# Patient Record
Sex: FEMALE | URBAN - METROPOLITAN AREA
[De-identification: names, ages, dates, MRNs, and addresses within clinical notes are randomized per-mention and may not be internally consistent; named-entity substitution may affect disease eponyms.]

---
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
## 2022-02-03 ENCOUNTER — APPOINTMENT (RX ONLY)
Dept: URBAN - METROPOLITAN AREA CLINIC 176 | Facility: CLINIC | Age: 22
Setting detail: DERMATOLOGY
End: 2022-02-03

## 2022-02-03 VITALS — WEIGHT: 112 LBS | HEIGHT: 66 IN

## 2022-02-03 DIAGNOSIS — Z72.0 TOBACCO USE: ICD-10-CM

## 2022-02-03 DIAGNOSIS — Z79.899 OTHER LONG TERM (CURRENT) DRUG THERAPY: ICD-10-CM

## 2022-02-03 DIAGNOSIS — L70.0 ACNE VULGARIS: ICD-10-CM

## 2022-02-03 PROCEDURE — 99214 OFFICE O/P EST MOD 30 MIN: CPT

## 2022-02-03 PROCEDURE — 81025 URINE PREGNANCY TEST: CPT

## 2022-02-03 PROCEDURE — ? ADDITIONAL NOTES

## 2022-02-03 PROCEDURE — ? SMOKING CESSATION COUNSELING

## 2022-02-03 PROCEDURE — ? COUNSELING

## 2022-02-03 PROCEDURE — ? PRESCRIPTION

## 2022-02-03 PROCEDURE — ? URINE PREGNANCY TEST

## 2022-02-03 PROCEDURE — ? ORDER TESTS

## 2022-02-03 PROCEDURE — ? ISOTRETINOIN MONITORING

## 2022-02-03 RX ORDER — ISOTRETINOIN 30 MG/1
CAPSULE ORAL
Qty: 30 | Refills: 0 | Status: ERX

## 2022-02-03 ASSESSMENT — LOCATION ZONE DERM
LOCATION ZONE: FACE
LOCATION ZONE: TRUNK

## 2022-02-03 ASSESSMENT — LOCATION SIMPLE DESCRIPTION DERM
LOCATION SIMPLE: LEFT FOREHEAD
LOCATION SIMPLE: RIGHT CHEEK
LOCATION SIMPLE: CHEST
LOCATION SIMPLE: LEFT CHEEK

## 2022-02-03 NOTE — PROCEDURE: ADDITIONAL NOTES
Additional Notes: Patient states rhofade made redness worse so stopped, I think redness may just be from dryness from accutane. She looks great we are going to taper her to one pill by mouth once daily. I still clear next month can probably D/C. \\n\\n\\nLN:\\nPt states had redness in cheeks we will try some rhofade topical once daily and see if that helps with redness. She needs labs! She states she will go she states she lost the slip, she is feeling well and no SE so I do not feel anything should be abnormal I would just like one on the higher dose to be sure internally we are all good. A deeper breakout on the L cheek today. \\n\\nLN:\\nStill some breakouts this month, she asked about scarring, recommend MN/Peels post accutane, focus to get breakouts under control, did not get labs so urine preg done in office, will go ahead and confirm so she is not out of med, but need labs to make sure the elevated dose did not affect internally. If increase SE then decrease dose. \\n\\nLN: \\nDoing well. Labs look good we will ct with this dose. She did have a flare at the beginning of f the month doing better now. \\n\\nLN: \\n\\nDid well this first month, some dryness noted, ct gentle skin care and chapstick. Decided to increase dose, if increased side effects then decrease back to one pill by mouth daily. \\n\\nLN: \\nPt denies history of depression not taking any medications for it. Discussed fasting before next labs and also to take pill with something a little fatty like avocado or peanut butter to increase absorption.
Render Risk Assessment In Note?: no
Detail Level: Simple

## 2022-02-03 NOTE — HPI: MEDICATION (ISOTRETINOIN)
How Severe Is It?: moderate
Is This A New Presentation, Or A Follow-Up?: Follow Up Isotretinoin
Display Cumulative Dose In The Note?: Yes
Patient Reported Weight In Pounds (Required For Calculation): 110
When Was Isotretinoin Started?: 9/21

## 2022-02-03 NOTE — PROCEDURE: ORDER TESTS
Billing Type: Third-Party Bill
Expected Date Of Service: 08/10/2021
Bill For Surgical Tray: no
Performing Laboratory: [id number]
Lab Facility: [id number]

## 2022-02-03 NOTE — PROCEDURE: ISOTRETINOIN MONITORING
Any Hypertriglyceridemia?: No
Show How Many Months Of Anticipated Therapy Are Left: Yes
Dosing Month 4 (Required For Cumulative Dosing): 30mg BID
Is Xerosis Present?: Yes - Normal Treatment
Hypertriglyceridemia Monitoring: I explained this is common when taking isotretinoin. We will monitor closely.
Nosebleeds Normal Treatment: I explained this is common when taking isotretinoin. I recommended saline mist in each nostril multiple times a day. If this worsens they will contact us.
Use Therapeutic Ranged Or Therapeutic Target: Range
Xerosis Normal Treatment: I recommended application of Cetaphil or CeraVe numerous times a day and before going to bed to all dry areas.
Female Pregnancy Counseling Text: Female patients should also be on two forms of birth control while taking this medication and for one month after their last dose.
Counseling Text: I reviewed the side effect in detail. Patient should get monthly blood tests, not donate blood, not drive at night if vision affected, and not share medication.
Myalgia Monitoring: I explained this is common when taking isotretinoin. If this worsens they will contact us.
Xerosis Aggressive Treatment: I recommended application of Cetaphil or CeraVe numerous times a day and before going to bed to all dry areas. I also prescribed a topical steroid for twice daily use.
Cheilitis Aggressive Treatment: I recommended application of Vaseline or Aquaphor numerous times a day (as often as every hour) and before going to bed. I also prescribed a topical steroid for twice daily use.
Myalgia Treatment: I explained this is common when taking isotretinoin. If this worsens they will contact us. They may try OTC ibuprofen.
Cheilitis Normal Treatment: I recommended application of Vaseline or Aquaphor numerous times a day (as often as every hour) and before going to bed.
Most Recent Beta Hcg (Optional): Negative
Lower Range (In Mg/Kg): 150
Ipledge Number (Optional): [id number]
Pounds Preamble Statement (Weight Entered In Details Tab): Reported Weight in pounds:
Kilograms Preamble Statement (Weight Entered In Details Tab): Reported Weight in kilograms:
Weight Units: pounds
Upper Range (In Mg/Kg): 250
Female Completion Statement: After discussing her treatment course we decided to discontinue isotretinoin therapy at this time. I explained that she would need to continue her birth control methods for at least one month after the last dosage. She should also get a pregnancy test one month after the last dose. She shouldn't donate blood for one month after the last dose. She should call with any new symptoms of depression.
Target Cumulative Dosage (In Mg/Kg): 135
Months Of Therapy Completed: 1
Patient Weight (Optional But Required For Cumulative Dose-Numbers And Decimals Only): 113
Male Completion Statement: After discussing his treatment course we decided to discontinue isotretinoin therapy at this time. He shouldn't donate blood for one month after the last dose. He should call with any new symptoms of depression.
Retinoid Dermatitis Normal Treatment: I recommended more frequent application of Cetaphil or CeraVe to the areas of dermatitis.
Headache Monitoring: I recommended monitoring the headaches for now. There is no evidence of increased intracranial pressure. They were instructed to call if the headaches are worsening.
Retinoid Dermatitis Aggressive Treatment: I recommended more frequent application of Cetaphil or CeraVe to the areas of dermatitis. I also prescribed a topical steroid for twice daily use until the dermatitis resolves.
Dosing Month 1 (Required For Cumulative Dosing): 30mg Daily
Detail Level: Zone
Xerosis Normal Treatment: I recommended application of Cetaphil or CeraVe numerous times a day going to bed to all dry areas.
Xerosis Aggressive Treatment: I recommended application of Cetaphil or CeraVe numerous times a day going to bed to all dry areas. I also prescribed a topical steroid for twice daily use.
What Is The Patient's Gender: Female

## 2022-02-03 NOTE — PROCEDURE: URINE PREGNANCY TEST
Lot # (Optional): [id number]
Performed By: Sara
Detail Level: None
Expiration Date (Optional): 6/30/23
Urine Pregnancy Test Result: negative

## 2022-03-10 ENCOUNTER — APPOINTMENT (RX ONLY)
Dept: URBAN - METROPOLITAN AREA CLINIC 176 | Facility: CLINIC | Age: 22
Setting detail: DERMATOLOGY
End: 2022-03-10

## 2022-03-10 VITALS — HEIGHT: 66 IN | WEIGHT: 112 LBS

## 2022-03-10 DIAGNOSIS — Z72.0 TOBACCO USE: ICD-10-CM

## 2022-03-10 DIAGNOSIS — L70.0 ACNE VULGARIS: ICD-10-CM

## 2022-03-10 PROCEDURE — ? COUNSELING

## 2022-03-10 PROCEDURE — ? ADDITIONAL NOTES

## 2022-03-10 PROCEDURE — ? ISOTRETINOIN MONITORING

## 2022-03-10 PROCEDURE — ? URINE PREGNANCY TEST

## 2022-03-10 PROCEDURE — ? SMOKING CESSATION COUNSELING

## 2022-03-10 PROCEDURE — 81025 URINE PREGNANCY TEST: CPT

## 2022-03-10 PROCEDURE — 99214 OFFICE O/P EST MOD 30 MIN: CPT

## 2022-03-10 ASSESSMENT — LOCATION ZONE DERM
LOCATION ZONE: FACE
LOCATION ZONE: TRUNK

## 2022-03-10 ASSESSMENT — LOCATION DETAILED DESCRIPTION DERM
LOCATION DETAILED: LEFT INFERIOR CENTRAL MALAR CHEEK
LOCATION DETAILED: UPPER STERNUM
LOCATION DETAILED: LEFT INFERIOR MEDIAL FOREHEAD

## 2022-03-10 ASSESSMENT — LOCATION SIMPLE DESCRIPTION DERM
LOCATION SIMPLE: CHEST
LOCATION SIMPLE: LEFT FOREHEAD
LOCATION SIMPLE: LEFT CHEEK

## 2022-03-10 NOTE — HPI: MEDICATION (ISOTRETINOIN)
How Severe Is It?: moderate
Is This A New Presentation, Or A Follow-Up?: Follow Up Isotretinoin
Patient Reported Weight In Pounds (Required For Calculation): 112
When Was Isotretinoin Started?: 8/21

## 2022-03-10 NOTE — PROCEDURE: ISOTRETINOIN MONITORING
Any Hypertriglyceridemia?: No
Completed Therapy?: Yes
Dosing Month 4 (Required For Cumulative Dosing): 30mg BID
Is Xerosis Present?: Yes - Normal Treatment
Hypertriglyceridemia Monitoring: I explained this is common when taking isotretinoin. We will monitor closely.
Are Labs Available For Review?: No- Not Drawn Yet
Nosebleeds Normal Treatment: I explained this is common when taking isotretinoin. I recommended saline mist in each nostril multiple times a day. If this worsens they will contact us.
Use Therapeutic Ranged Or Therapeutic Target: Range
Xerosis Normal Treatment: I recommended application of Cetaphil or CeraVe numerous times a day and before going to bed to all dry areas.
Female Pregnancy Counseling Text: Female patients should also be on two forms of birth control while taking this medication and for one month after their last dose.
Counseling Text: I reviewed the side effect in detail. Patient should get monthly blood tests, not donate blood, not drive at night if vision affected, and not share medication.
Myalgia Monitoring: I explained this is common when taking isotretinoin. If this worsens they will contact us.
Xerosis Aggressive Treatment: I recommended application of Cetaphil or CeraVe numerous times a day and before going to bed to all dry areas. I also prescribed a topical steroid for twice daily use.
Dosing Month 6 (Required For Cumulative Dosing): 30mg Daily
Cheilitis Aggressive Treatment: I recommended application of Vaseline or Aquaphor numerous times a day (as often as every hour) and before going to bed. I also prescribed a topical steroid for twice daily use.
Myalgia Treatment: I explained this is common when taking isotretinoin. If this worsens they will contact us. They may try OTC ibuprofen.
Cheilitis Normal Treatment: I recommended application of Vaseline or Aquaphor numerous times a day (as often as every hour) and before going to bed.
Most Recent Beta Hcg (Optional): Negative
Lower Range (In Mg/Kg): 150
Ipledge Number (Optional): [id number]
Pounds Preamble Statement (Weight Entered In Details Tab): Reported Weight in pounds:
Kilograms Preamble Statement (Weight Entered In Details Tab): Reported Weight in kilograms:
Weight Units: pounds
Upper Range (In Mg/Kg): 250
Female Completion Statement: After discussing her treatment course we decided to discontinue isotretinoin therapy at this time. I explained that she would need to continue her birth control methods for at least one month after the last dosage. She should also get a pregnancy test one month after the last dose. She shouldn't donate blood for one month after the last dose. She should call with any new symptoms of depression.
Target Cumulative Dosage (In Mg/Kg): 135
Months Of Therapy Completed: 1
Patient Weight (Optional But Required For Cumulative Dose-Numbers And Decimals Only): 113
Male Completion Statement: After discussing his treatment course we decided to discontinue isotretinoin therapy at this time. He shouldn't donate blood for one month after the last dose. He should call with any new symptoms of depression.
Retinoid Dermatitis Normal Treatment: I recommended more frequent application of Cetaphil or CeraVe to the areas of dermatitis.
Headache Monitoring: I recommended monitoring the headaches for now. There is no evidence of increased intracranial pressure. They were instructed to call if the headaches are worsening.
Next Month's Dosage: Continue Current Dosage
Retinoid Dermatitis Aggressive Treatment: I recommended more frequent application of Cetaphil or CeraVe to the areas of dermatitis. I also prescribed a topical steroid for twice daily use until the dermatitis resolves.
Detail Level: Zone
Xerosis Normal Treatment: I recommended application of Cetaphil or CeraVe numerous times a day going to bed to all dry areas.
Xerosis Aggressive Treatment: I recommended application of Cetaphil or CeraVe numerous times a day going to bed to all dry areas. I also prescribed a topical steroid for twice daily use.
What Is The Patient's Gender: Female

## 2022-03-10 NOTE — PROCEDURE: ADDITIONAL NOTES
Additional Notes: Pt fully clear! Ok to D/C. \\n\\nLN:\\n\\nPatient states rhofade made redness worse so stopped, I think redness may just be from dryness from accutane. She looks great we are going to taper her to one pill by mouth once daily. I still clear next month can probably D/C. \\n\\n\\nLN:\\nPt states had redness in cheeks we will try some rhofade topical once daily and see if that helps with redness. She needs labs! She states she will go she states she lost the slip, she is feeling well and no SE so I do not feel anything should be abnormal I would just like one on the higher dose to be sure internally we are all good. A deeper breakout on the L cheek today. \\n\\nLN:\\nStill some breakouts this month, she asked about scarring, recommend MN/Peels post accutane, focus to get breakouts under control, did not get labs so urine preg done in office, will go ahead and confirm so she is not out of med, but need labs to make sure the elevated dose did not affect internally. If increase SE then decrease dose. \\n\\nLN: \\nDoing well. Labs look good we will ct with this dose. She did have a flare at the beginning of f the month doing better now. \\n\\nLN: \\n\\nDid well this first month, some dryness noted, ct gentle skin care and chapstick. Decided to increase dose, if increased side effects then decrease back to one pill by mouth daily. \\n\\nLN: \\nPt denies history of depression not taking any medications for it. Discussed fasting before next labs and also to take pill with something a little fatty like avocado or peanut butter to increase absorption.
Render Risk Assessment In Note?: no
Detail Level: Simple

## 2022-03-10 NOTE — PROCEDURE: URINE PREGNANCY TEST
Lot # (Optional): [id number]
Performed By: Sara
Detail Level: None
Expiration Date (Optional): 8/31/23
Urine Pregnancy Test Result: negative

## 2022-04-14 ENCOUNTER — APPOINTMENT (RX ONLY)
Dept: URBAN - METROPOLITAN AREA CLINIC 176 | Facility: CLINIC | Age: 22
Setting detail: DERMATOLOGY
End: 2022-04-14

## 2022-04-14 VITALS — WEIGHT: 113 LBS | HEIGHT: 66 IN

## 2022-04-14 DIAGNOSIS — L81.0 POSTINFLAMMATORY HYPERPIGMENTATION: ICD-10-CM

## 2022-04-14 DIAGNOSIS — L70.0 ACNE VULGARIS: ICD-10-CM | Status: WELL CONTROLLED

## 2022-04-14 DIAGNOSIS — Z72.0 TOBACCO USE: ICD-10-CM

## 2022-04-14 DIAGNOSIS — L30.0 NUMMULAR DERMATITIS: ICD-10-CM | Status: INADEQUATELY CONTROLLED

## 2022-04-14 PROCEDURE — ? PRESCRIPTION SAMPLES PROVIDED

## 2022-04-14 PROCEDURE — ? SMOKING CESSATION COUNSELING

## 2022-04-14 PROCEDURE — 81025 URINE PREGNANCY TEST: CPT

## 2022-04-14 PROCEDURE — 99214 OFFICE O/P EST MOD 30 MIN: CPT

## 2022-04-14 PROCEDURE — ? ADDITIONAL NOTES

## 2022-04-14 PROCEDURE — ? URINE PREGNANCY TEST

## 2022-04-14 PROCEDURE — ? COUNSELING

## 2022-04-14 ASSESSMENT — LOCATION DETAILED DESCRIPTION DERM
LOCATION DETAILED: LEFT INFERIOR CENTRAL MALAR CHEEK
LOCATION DETAILED: LEFT ANTERIOR PROXIMAL THIGH
LOCATION DETAILED: RIGHT INFERIOR CENTRAL MALAR CHEEK
LOCATION DETAILED: UPPER STERNUM
LOCATION DETAILED: LEFT INFERIOR MEDIAL FOREHEAD

## 2022-04-14 ASSESSMENT — LOCATION SIMPLE DESCRIPTION DERM
LOCATION SIMPLE: LEFT THIGH
LOCATION SIMPLE: LEFT CHEEK
LOCATION SIMPLE: LEFT FOREHEAD
LOCATION SIMPLE: RIGHT CHEEK
LOCATION SIMPLE: CHEST

## 2022-04-14 ASSESSMENT — LOCATION ZONE DERM
LOCATION ZONE: FACE
LOCATION ZONE: LEG
LOCATION ZONE: TRUNK

## 2022-04-14 ASSESSMENT — SEVERITY ASSESSMENT OVERALL AMONG ALL PATIENTS
IN YOUR EXPERIENCE, AMONG ALL PATIENTS YOU HAVE SEEN WITH THIS CONDITION, HOW SEVERE IS THIS PATIENT'S CONDITION?: NORMAL

## 2022-04-14 NOTE — HPI: MEDICATION (ISOTRETINOIN)
How Severe Is It?: moderate
Is This A New Presentation, Or A Follow-Up?: Follow Up Isotretinoin
Patient Reported Weight In Pounds (Required For Calculation): 113

## 2022-04-14 NOTE — PROCEDURE: PRESCRIPTION SAMPLES PROVIDED
Samples Given: Ultravate samples use BID x 1 week, do not use on the face, moisturize daily
Detail Level: Zone

## 2022-04-14 NOTE — PROCEDURE: URINE PREGNANCY TEST
Lot # (Optional): [id number]
Performed By: Sara
Detail Level: None
Expiration Date (Optional): 8/31/23
Urine Pregnancy Test Result: negative

## 2022-04-14 NOTE — PROCEDURE: ADDITIONAL NOTES
Additional Notes: Completed post pregnancy test, if gets any breakouts she has avar and epiduo at home that she can try. \\n\\nLN: \\nPt fully clear! Ok to D/C. \\n\\nLN:\\n\\nPatient states rhofade made redness worse so stopped, I think redness may just be from dryness from accutane. She looks great we are going to taper her to one pill by mouth once daily. I still clear next month can probably D/C. \\n\\n\\nLN:\\nPt states had redness in cheeks we will try some rhofade topical once daily and see if that helps with redness. She needs labs! She states she will go she states she lost the slip, she is feeling well and no SE so I do not feel anything should be abnormal I would just like one on the higher dose to be sure internally we are all good. A deeper breakout on the L cheek today. \\n\\nLN:\\nStill some breakouts this month, she asked about scarring, recommend MN/Peels post accutane, focus to get breakouts under control, did not get labs so urine preg done in office, will go ahead and confirm so she is not out of med, but need labs to make sure the elevated dose did not affect internally. If increase SE then decrease dose. \\n\\nLN: \\nDoing well. Labs look good we will ct with this dose. She did have a flare at the beginning of f the month doing better now. \\n\\nLN: \\n\\nDid well this first month, some dryness noted, ct gentle skin care and chapstick. Decided to increase dose, if increased side effects then decrease back to one pill by mouth daily. \\n\\nLN: \\nPt denies history of depression not taking any medications for it. Discussed fasting before next labs and also to take pill with something a little fatty like avocado or peanut butter to increase absorption.
Render Risk Assessment In Note?: no
Detail Level: Simple
Additional Notes: Prior area of atopic derm with some PIH
Detail Level: Detailed